# Patient Record
(demographics unavailable — no encounter records)

---

## 2025-05-31 NOTE — PHYSICAL EXAM
[Left] : left knee [5___] : quadriceps 5[unfilled]/5 [Positive] : positive Qaung [Right] : right knee [Negative] : negative Ny's [] : mildly antalgic [Bilateral] : knee bilaterally [All Views] : anteroposterior, lateral, skyline, and anteroposterior standing [There are no fractures, subluxations or dislocations. No significant abnormalities are seen] : There are no fractures, subluxations or dislocations. No significant abnormalities are seen [TWNoteComboBox7] : flexion 130 degrees

## 2025-05-31 NOTE — HISTORY OF PRESENT ILLNESS
[de-identified] : 05/31/2025: 27 year old female c/o b/l knee pain that developed 2 weeks ago from jumping on a trampoline, bothered her afterwards. Went to [facility] and had xrays and given KI, on no meds [FreeTextEntry1] : b/l knee  [FreeTextEntry5] : b/l knee pain

## 2025-05-31 NOTE — DISCUSSION/SUMMARY
[de-identified] : modify activities use elastic sleeve for structural support try OTC meds ice as needed try topical lidocaine for pain control reviewed current medications used by this patient home exercises for functional return  RE:  BRYCE CRANE   Acct #- [id number]   Attention:  Nurse Reviewer /Medical Director    Based on my patient's condition, I strongly believe that the MRI L kneeis medically.necessary.   The patient has failed oral meds, injections and PT and conservative treatment in combination or by themselves and therefore needs the MRI.   The MRI will dictate further treatment t recommendations. try sleeve instead of KI